# Patient Record
Sex: FEMALE | Race: WHITE
[De-identification: names, ages, dates, MRNs, and addresses within clinical notes are randomized per-mention and may not be internally consistent; named-entity substitution may affect disease eponyms.]

---

## 2017-06-18 NOTE — REPUSA
Clinical history: Right upper quadrant pain.

Findings: The pancreas is limited in visualization secondary to overlying bowel gas, but appears krishan
sly unremarkable. The liver demonstrates uniform echotexture and echogenicity, with no mass lesions. 
The gallbladder is unremarkable. The common bile duct measures  6 mm and is within normal limits. The
 right kidney measures 12.3 cm in length. There is a cyst in the upper pole measuring 10.2 x 7.6 x 9.
3 mm. There is no ascites.

Impression:

1. No acute abnormality to explain the patient's pain.

2. Simple cyst in the right kidney.

 

     Electronically signed by ZEESHAN GONZALES MD on 06/18/2017 11:24:21 PM ET

## 2018-06-22 ENCOUNTER — HOSPITAL ENCOUNTER (EMERGENCY)
Dept: HOSPITAL 53 - M ED | Age: 38
Discharge: HOME | End: 2018-06-22
Payer: COMMERCIAL

## 2018-06-22 DIAGNOSIS — F33.9: Primary | ICD-10-CM

## 2018-06-22 DIAGNOSIS — F17.200: ICD-10-CM

## 2018-06-22 DIAGNOSIS — F41.9: ICD-10-CM

## 2018-06-22 DIAGNOSIS — Z79.899: ICD-10-CM

## 2018-06-22 LAB
ACETAMINOPHEN LEVEL: < 2 UG/ML (ref 10–30)
ALBUMIN/GLOBULIN RATIO: 1.08 (ref 1–1.93)
ALBUMIN: 4.2 GM/DL (ref 3.2–5.2)
ALKALINE PHOSPHATASE: 75 U/L (ref 45–117)
ALT SERPL W P-5'-P-CCNC: 21 U/L (ref 12–78)
AMPHETAMINES UR QL SCN: POSITIVE
ANION GAP: 8 MEQ/L (ref 8–16)
AST SERPL-CCNC: 11 U/L (ref 7–37)
BARBITURATES UR QL SCN: NEGATIVE
BENZODIAZ UR QL SCN: NEGATIVE
BILIRUB CONJ SERPL-MCNC: 0.1 MG/DL (ref 0–0.2)
BILIRUBIN,TOTAL: 0.4 MG/DL (ref 0.2–1)
BLOOD UREA NITROGEN: 17 MG/DL (ref 7–18)
BZE UR QL SCN: NEGATIVE
CALCIUM LEVEL: 9 MG/DL (ref 8.5–10.1)
CANNABINOIDS UR QL SCN: NEGATIVE
CARBON DIOXIDE LEVEL: 30 MEQ/L (ref 21–32)
CHLORIDE LEVEL: 102 MEQ/L (ref 98–107)
CONTROL LINE HCG: (no result)
CREATININE FOR GFR: 0.9 MG/DL (ref 0.55–1.3)
ETHYL ALCOHOL (ETHANOL): < 0.003 % (ref 0–0.01)
GFR SERPL CREATININE-BSD FRML MDRD: > 60 ML/MIN/{1.73_M2} (ref 58–?)
GLUCOSE, FASTING: 124 MG/DL (ref 70–100)
HCG, SERUM QUALITATIVE: NEGATIVE
HEMATOCRIT: 42.6 % (ref 36–47)
HEMOGLOBIN: 14.2 G/DL (ref 12–15.5)
MEAN CORPUSCULAR HEMOGLOBIN: 30.9 PG (ref 27–33)
MEAN CORPUSCULAR HGB CONC: 33.3 G/DL (ref 32–36.5)
MEAN CORPUSCULAR VOLUME: 92.8 FL (ref 80–96)
METHADONE URINE: POSITIVE
NRBC BLD AUTO-RTO: 0 % (ref 0–0)
OPIATES UR QL SCN: NEGATIVE
PCP UR QL SCN: NEGATIVE
PLATELET COUNT, AUTOMATED: 301 10^3/UL (ref 150–450)
POTASSIUM SERUM: 4 MEQ/L (ref 3.5–5.1)
RED BLOOD COUNT: 4.59 10^6/UL (ref 4–5.4)
RED CELL DISTRIBUTION WIDTH: 12.1 % (ref 11.5–14.5)
SALICYLATE LEVEL: < 1.7 MG/DL (ref 5–30)
SODIUM LEVEL: 140 MEQ/L (ref 136–145)
THYROID STIMULATING HORMONE: 1.1 UIU/ML (ref 0.36–3.74)
TOTAL PROTEIN: 8.1 GM/DL (ref 6.4–8.2)
WHITE BLOOD COUNT: 9.1 10^3/UL (ref 4–10)

## 2018-06-26 ENCOUNTER — HOSPITAL ENCOUNTER (OUTPATIENT)
Dept: HOSPITAL 53 - M OUTALCOH | Age: 38
End: 2018-06-26
Attending: PSYCHIATRY & NEUROLOGY
Payer: COMMERCIAL

## 2018-06-26 DIAGNOSIS — F11.10: ICD-10-CM

## 2018-06-26 DIAGNOSIS — Z13.9: Primary | ICD-10-CM

## 2018-07-02 ENCOUNTER — HOSPITAL ENCOUNTER (EMERGENCY)
Dept: HOSPITAL 53 - M ED | Age: 38
Discharge: HOME | End: 2018-07-02
Payer: COMMERCIAL

## 2018-07-02 DIAGNOSIS — R19.7: Primary | ICD-10-CM

## 2018-07-02 DIAGNOSIS — N28.1: ICD-10-CM

## 2018-07-02 DIAGNOSIS — M51.9: ICD-10-CM

## 2018-07-02 DIAGNOSIS — R10.84: ICD-10-CM

## 2018-07-02 DIAGNOSIS — F17.210: ICD-10-CM

## 2018-07-02 DIAGNOSIS — Z79.899: ICD-10-CM

## 2018-07-02 LAB
ALBUMIN/GLOBULIN RATIO: 1.12 (ref 1–1.93)
ALBUMIN: 3.7 GM/DL (ref 3.2–5.2)
ALKALINE PHOSPHATASE: 63 U/L (ref 45–117)
ALT SERPL W P-5'-P-CCNC: 19 U/L (ref 12–78)
AMORPH SED URNS QL MICRO: (no result)
ANION GAP: 8 MEQ/L (ref 8–16)
APPEARANCE, URINE: (no result)
AST SERPL-CCNC: 11 U/L (ref 7–37)
BACTERIA UR QL AUTO: NEGATIVE
BASO #: 0 10^3/UL (ref 0–0.2)
BASO %: 0.5 % (ref 0–1)
BILIRUBIN, URINE AUTO: NEGATIVE
BILIRUBIN,TOTAL: 0.3 MG/DL (ref 0.2–1)
BLOOD UREA NITROGEN: 10 MG/DL (ref 7–18)
BLOOD, URINE BLOOD: NEGATIVE
CALCIUM LEVEL: 8.3 MG/DL (ref 8.5–10.1)
CAOX CRY URNS QL MICRO: (no result)
CARBON DIOXIDE LEVEL: 27 MEQ/L (ref 21–32)
CHLORIDE LEVEL: 108 MEQ/L (ref 98–107)
CREATININE FOR GFR: 0.75 MG/DL (ref 0.55–1.3)
EOS #: 0 10^3/UL (ref 0–0.5)
EOSINOPHIL NFR BLD AUTO: 0.1 % (ref 0–3)
GFR SERPL CREATININE-BSD FRML MDRD: > 60 ML/MIN/{1.73_M2} (ref 58–?)
GLUCOSE, FASTING: 105 MG/DL (ref 70–100)
GLUCOSE, URINE (UA) AUTO: NEGATIVE MG/DL
HEMATOCRIT: 39.1 % (ref 36–47)
HEMOGLOBIN: 13.4 G/DL (ref 12–15.5)
IMMATURE GRANULOCYTE %: 0.3 % (ref 0–3)
KETONE, URINE AUTO: NEGATIVE MG/DL
LEUKOCYTE ESTERASE UR QL STRIP.AUTO: (no result)
LIPASE: 140 U/L (ref 73–393)
LYMPH #: 2.3 10^3/UL (ref 1.5–4.5)
LYMPH %: 25.5 % (ref 24–44)
MEAN CORPUSCULAR HEMOGLOBIN: 31.8 PG (ref 27–33)
MEAN CORPUSCULAR HGB CONC: 34.3 G/DL (ref 32–36.5)
MEAN CORPUSCULAR VOLUME: 92.7 FL (ref 80–96)
MONO #: 0.8 10^3/UL (ref 0–0.8)
MONO %: 8.6 % (ref 0–5)
MUCUS, URINE: (no result)
NEUTROPHILS #: 5.7 10^3/UL (ref 1.8–7.7)
NEUTROPHILS %: 65 % (ref 36–66)
NITRITE, URINE AUTO: NEGATIVE
NRBC BLD AUTO-RTO: 0 % (ref 0–0)
PH,URINE: 7 UNITS (ref 5–9)
PLATELET COUNT, AUTOMATED: 260 10^3/UL (ref 150–450)
POTASSIUM SERUM: 3.5 MEQ/L (ref 3.5–5.1)
PROT UR QL STRIP.AUTO: NEGATIVE MG/DL
RBC, URINE AUTO: 2 /HPF (ref 0–3)
RED BLOOD COUNT: 4.22 10^6/UL (ref 4–5.4)
RED CELL DISTRIBUTION WIDTH: 12.3 % (ref 11.5–14.5)
SODIUM LEVEL: 143 MEQ/L (ref 136–145)
SPECIFIC GRAVITY URINE AUTO: 1.02 (ref 1–1.03)
SQUAMOUS #/AREA URNS AUTO: 6 /HPF (ref 0–6)
TOTAL PROTEIN: 7 GM/DL (ref 6.4–8.2)
UROBILINOGEN, URINE AUTO: 0.2 MG/DL (ref 0–2)
WBC, URINE AUTO: 3 /HPF (ref 0–3)
WHITE BLOOD COUNT: 8.8 10^3/UL (ref 4–10)

## 2018-07-02 RX ADMIN — SODIUM CHLORIDE 1 MLS/HR: 9 INJECTION, SOLUTION INTRAVENOUS at 16:41

## 2018-07-02 RX ADMIN — METHOCARBAMOL 1 MG: 100 INJECTION, SOLUTION INTRAMUSCULAR; INTRAVENOUS at 16:41

## 2018-07-20 ENCOUNTER — HOSPITAL ENCOUNTER (OUTPATIENT)
Dept: HOSPITAL 53 - M LAB | Age: 38
End: 2018-07-20
Attending: NURSE PRACTITIONER

## 2018-07-20 DIAGNOSIS — Z02.89: Primary | ICD-10-CM

## 2018-07-20 LAB — RUBELLA IGG QUALITATIVE: (no result)

## 2018-07-21 LAB — RUBEOLA IGG ANTIBODY: 96.3 AU/ML

## 2018-09-19 ENCOUNTER — HOSPITAL ENCOUNTER (OUTPATIENT)
Dept: HOSPITAL 53 - M SFHCLERA | Age: 38
End: 2018-09-19
Attending: NURSE PRACTITIONER
Payer: COMMERCIAL

## 2018-09-19 DIAGNOSIS — J02.9: Primary | ICD-10-CM

## 2019-03-08 ENCOUNTER — HOSPITAL ENCOUNTER (OUTPATIENT)
Dept: HOSPITAL 53 - M SFHCLERA | Age: 39
End: 2019-03-08
Attending: PHYSICIAN ASSISTANT
Payer: COMMERCIAL

## 2019-03-08 DIAGNOSIS — J02.9: Primary | ICD-10-CM

## 2019-03-11 ENCOUNTER — HOSPITAL ENCOUNTER (OUTPATIENT)
Dept: HOSPITAL 53 - M LRY | Age: 39
End: 2019-03-11
Attending: PHYSICIAN ASSISTANT
Payer: COMMERCIAL

## 2019-03-11 DIAGNOSIS — R05: ICD-10-CM

## 2019-03-11 DIAGNOSIS — R50.9: Primary | ICD-10-CM

## 2019-03-11 DIAGNOSIS — Z53.8: ICD-10-CM

## 2019-07-08 ENCOUNTER — HOSPITAL ENCOUNTER (EMERGENCY)
Dept: HOSPITAL 53 - M ED | Age: 39
Discharge: LEFT BEFORE DISCHARGE | End: 2019-07-08
Payer: COMMERCIAL

## 2019-07-08 VITALS — WEIGHT: 160.34 LBS | BODY MASS INDEX: 28.41 KG/M2 | HEIGHT: 63 IN

## 2019-07-08 VITALS — SYSTOLIC BLOOD PRESSURE: 152 MMHG | DIASTOLIC BLOOD PRESSURE: 93 MMHG

## 2019-07-08 DIAGNOSIS — R07.0: Primary | ICD-10-CM

## 2019-07-08 DIAGNOSIS — Z53.21: ICD-10-CM

## 2019-07-19 ENCOUNTER — HOSPITAL ENCOUNTER (OUTPATIENT)
Dept: HOSPITAL 53 - M SFHCLERA | Age: 39
End: 2019-07-19
Attending: PHYSICIAN ASSISTANT
Payer: COMMERCIAL

## 2019-07-19 DIAGNOSIS — J30.9: Primary | ICD-10-CM

## 2019-07-19 LAB
BASOPHILS # BLD AUTO: 0.1 10^3/UL (ref 0–0.2)
BASOPHILS NFR BLD AUTO: 0.6 % (ref 0–1)
EOSINOPHIL # BLD AUTO: 0.1 10^3/UL (ref 0–0.5)
EOSINOPHIL NFR BLD AUTO: 0.7 % (ref 0–3)
HCT VFR BLD AUTO: 39.8 % (ref 36–47)
HGB BLD-MCNC: 13.4 G/DL (ref 12–15.5)
LYMPHOCYTES # BLD AUTO: 1.3 10^3/UL (ref 1.5–4.5)
LYMPHOCYTES NFR BLD AUTO: 14.7 % (ref 24–44)
MCH RBC QN AUTO: 33.1 PG (ref 27–33)
MCHC RBC AUTO-ENTMCNC: 33.7 G/DL (ref 32–36.5)
MCV RBC AUTO: 98.3 FL (ref 80–96)
MONOCYTES # BLD AUTO: 0.8 10^3/UL (ref 0–0.8)
MONOCYTES NFR BLD AUTO: 9.3 % (ref 0–5)
NEUTROPHILS # BLD AUTO: 6.8 10^3/UL (ref 1.8–7.7)
NEUTROPHILS NFR BLD AUTO: 74.4 % (ref 36–66)
PLATELET # BLD AUTO: 258 10^3/UL (ref 150–450)
RBC # BLD AUTO: 4.05 10^6/UL (ref 4–5.4)
T4 FREE SERPL-MCNC: 0.76 NG/DL (ref 0.76–1.46)
TSH SERPL DL<=0.005 MIU/L-ACNC: 1.6 UIU/ML (ref 0.36–3.74)
WBC # BLD AUTO: 9.1 10^3/UL (ref 4–10)

## 2019-08-01 ENCOUNTER — HOSPITAL ENCOUNTER (OUTPATIENT)
Dept: HOSPITAL 53 - M LAB REF | Age: 39
End: 2019-08-01
Attending: INTERNAL MEDICINE
Payer: COMMERCIAL

## 2019-08-01 DIAGNOSIS — E04.1: Primary | ICD-10-CM

## 2019-09-04 ENCOUNTER — HOSPITAL ENCOUNTER (EMERGENCY)
Dept: HOSPITAL 53 - M ED | Age: 39
Discharge: ANOTHER HOSPITAL | End: 2019-09-04
Payer: COMMERCIAL

## 2019-09-04 VITALS — HEIGHT: 63 IN | WEIGHT: 160.34 LBS | BODY MASS INDEX: 28.41 KG/M2

## 2019-09-04 VITALS — DIASTOLIC BLOOD PRESSURE: 80 MMHG | SYSTOLIC BLOOD PRESSURE: 124 MMHG

## 2019-09-04 DIAGNOSIS — Z79.899: ICD-10-CM

## 2019-09-04 DIAGNOSIS — R00.0: ICD-10-CM

## 2019-09-04 DIAGNOSIS — F17.200: ICD-10-CM

## 2019-09-04 DIAGNOSIS — I44.4: ICD-10-CM

## 2019-09-04 DIAGNOSIS — E06.9: Primary | ICD-10-CM

## 2019-09-04 LAB
BASOPHILS # BLD AUTO: 0 10^3/UL (ref 0–0.2)
BASOPHILS NFR BLD AUTO: 0.4 % (ref 0–1)
BUN SERPL-MCNC: 15 MG/DL (ref 7–18)
CALCIUM SERPL-MCNC: 8.9 MG/DL (ref 8.5–10.1)
CHLORIDE SERPL-SCNC: 107 MEQ/L (ref 98–107)
CK MB CFR.DF SERPL CALC: 0.8
CK MB SERPL-MCNC: 1.6 NG/ML (ref ?–3.6)
CK SERPL-CCNC: 199 U/L (ref 26–192)
CO2 SERPL-SCNC: 29 MEQ/L (ref 21–32)
CREAT SERPL-MCNC: 0.68 MG/DL (ref 0.55–1.3)
EOSINOPHIL # BLD AUTO: 0 10^3/UL (ref 0–0.5)
EOSINOPHIL NFR BLD AUTO: 0.5 % (ref 0–3)
FT4I SERPL CALC-MCNC: 2.4 % (ref 1.3–4.8)
GFR SERPL CREATININE-BSD FRML MDRD: > 60 ML/MIN/{1.73_M2} (ref 58–?)
GLUCOSE SERPL-MCNC: 110 MG/DL (ref 70–100)
HCT VFR BLD AUTO: 41 % (ref 36–47)
HGB BLD-MCNC: 13.9 G/DL (ref 12–15.5)
LYMPHOCYTES # BLD AUTO: 1.5 10^3/UL (ref 1.5–5)
LYMPHOCYTES NFR BLD AUTO: 20.1 % (ref 24–44)
MCH RBC QN AUTO: 32.4 PG (ref 27–33)
MCHC RBC AUTO-ENTMCNC: 33.9 G/DL (ref 32–36.5)
MCV RBC AUTO: 95.6 FL (ref 80–96)
MONOCYTES # BLD AUTO: 0.6 10^3/UL (ref 0–0.8)
MONOCYTES NFR BLD AUTO: 8.3 % (ref 0–5)
NEUTROPHILS # BLD AUTO: 5.3 10^3/UL (ref 1.5–8.5)
NEUTROPHILS NFR BLD AUTO: 70.4 % (ref 36–66)
PLATELET # BLD AUTO: 279 10^3/UL (ref 150–450)
POTASSIUM SERPL-SCNC: 3.9 MEQ/L (ref 3.5–5.1)
RBC # BLD AUTO: 4.29 10^6/UL (ref 4–5.4)
SODIUM SERPL-SCNC: 139 MEQ/L (ref 136–145)
T3RU NFR SERPL: 31 % (ref 30–39)
T4 FREE SERPL-MCNC: 0.8 NG/DL (ref 0.76–1.46)
T4 SERPL-MCNC: 7.8 UG/DL (ref 4.5–12)
TROPONIN I SERPL-MCNC: < 0.02 NG/ML (ref ?–0.1)
TSH SERPL DL<=0.005 MIU/L-ACNC: 2.17 UIU/ML (ref 0.36–3.74)
WBC # BLD AUTO: 7.6 10^3/UL (ref 4–10)

## 2019-09-04 PROCEDURE — 82550 ASSAY OF CK (CPK): CPT

## 2019-09-04 PROCEDURE — 84439 ASSAY OF FREE THYROXINE: CPT

## 2019-09-04 PROCEDURE — 70491 CT SOFT TISSUE NECK W/DYE: CPT

## 2019-09-04 PROCEDURE — 94760 N-INVAS EAR/PLS OXIMETRY 1: CPT

## 2019-09-04 PROCEDURE — 96365 THER/PROPH/DIAG IV INF INIT: CPT

## 2019-09-04 PROCEDURE — 99285 EMERGENCY DEPT VISIT HI MDM: CPT

## 2019-09-04 PROCEDURE — 85025 COMPLETE CBC W/AUTO DIFF WBC: CPT

## 2019-09-04 PROCEDURE — 93005 ELECTROCARDIOGRAM TRACING: CPT

## 2019-09-04 PROCEDURE — 84443 ASSAY THYROID STIM HORMONE: CPT

## 2019-09-04 PROCEDURE — 80048 BASIC METABOLIC PNL TOTAL CA: CPT

## 2019-09-04 PROCEDURE — 84484 ASSAY OF TROPONIN QUANT: CPT

## 2019-09-04 PROCEDURE — 93041 RHYTHM ECG TRACING: CPT

## 2019-09-04 PROCEDURE — 84436 ASSAY OF TOTAL THYROXINE: CPT

## 2019-09-04 PROCEDURE — 36415 COLL VENOUS BLD VENIPUNCTURE: CPT

## 2019-09-04 PROCEDURE — 82553 CREATINE MB FRACTION: CPT

## 2019-09-04 PROCEDURE — 84479 ASSAY OF THYROID (T3 OR T4): CPT

## 2019-09-04 NOTE — REP
Clinical:  Enlarged tender thyroid gland.

 

Technique:  Axial contrast enhanced images from the skull base to the thoracic

inlet with coronal and sagittal re-formations using 100 ml Isovue 370 intravenous

contrast material.

 

Findings:

The right thyroid gland appears to be completely replaced by a large low density

possibly complex cystic lesion/nodule which measures roughly 7.8 x 5.2 x 4.1 cm

causing mass effect and mild contralateral midline shift to the trachea.  No

obvious significant residual normal enhancing thyroid tissue to the right lobe is

appreciated.  There is no significant enhancement pattern or internal gas to

suggest abscess.  These findings are relatively similar to recent ultrasound

dated 07/09/2019.  Associated reactive adenopathy in the cervical and jugular

chains are appreciated measuring up to 2 cm and suggest an

infectious/inflammatory process of the thyroid gland.

 

Remainder of the contrast enhanced CT of the neck appears normal.

 

Impression:

Enlarged right thyroid lobe with relatively low density texture and no

significant obvious enhancement.  Associated adenopathy is appreciated and

findings suggest an infectious/inflammatory process and possible phlegmonous

changes without discrete abscess.  Ultrasound examination should be considered to

evaluate for drainable fluid or other abnormality.

 

 

Electronically Signed by

Александр Renae MD 09/04/2019 04:24 P

## 2019-09-04 NOTE — ECGEPIP
[facility] - ED

                                       

                                       Test Date:    2019

Pat Name:     DAT BLAKE             Department:   

Patient ID:   [id number]                 Room:         -

Gender:       Female                   Technician:   DINORA

:          1980               Requested By: PEDRO ANDERSON

Order Number: [id number]     Reading MD:   Dia Neri

                                 Measurements

Intervals                              Axis          

Rate:         110                      P:            73

RI:           146                      QRS:          -75

QRSD:         82                       T:            57

QT:           321                                    

QTc:          435                                    

                           Interpretive Statements

SINUS TACHYCARDIA

LEFT ANTERIOR FASCICULAR BLOCK

NSTTW abnormalities

NO PRIOR

Electronically Signed on 2019 20:28:33 EDT by Dia Neri

## 2019-09-24 ENCOUNTER — HOSPITAL ENCOUNTER (OUTPATIENT)
Dept: HOSPITAL 53 - M CARPUL | Age: 39
End: 2019-09-24
Attending: NURSE PRACTITIONER
Payer: COMMERCIAL

## 2019-09-24 DIAGNOSIS — C85.10: Primary | ICD-10-CM

## 2019-09-25 ENCOUNTER — HOSPITAL ENCOUNTER (OUTPATIENT)
Dept: HOSPITAL 53 - M RAD | Age: 39
End: 2019-09-25
Attending: NURSE PRACTITIONER
Payer: COMMERCIAL

## 2019-09-25 DIAGNOSIS — C85.11: Primary | ICD-10-CM

## 2019-09-25 NOTE — ECHO
DATE OF PROCEDURE:  09/24/2019

 

AGE: 39

GENDER: Female

HEIGHT: 63 inches

WEIGHT:  160 pounds

BODY SURFACE AREA: 1.76 m2

 

PATIENT LOCATION: Outpatient

 

REFERRING PHYSICIAN: Flori Garcia NP

 

INDICATION:   Lymphoma, potentially cardiotoxic chemotherapy.

 

2-D MEASUREMENTS:

RV: 3.0 cm

LV: 5.4 cm

Septum: 0.8 cm

Posterior wall: 0.8 cm

Aortic root: 2.8 cm

LA: 2.9 cm

LVEF: 70%

 

DOPPLER MEASUREMENTS:

AV: 1.4 m/s

LVOT:  1.0 m/s

LVOT diameter:  2.0 cm

MV-E: 94, A: 65, EA ratio: 1.5

Early mitral deceleration time: 150 ms

E prime: 12.4, A prime: 9, E/E prime ratio: 7.6

PV: 0.9 m/s

Pulmonary artery acceleration time: 140 ms

PASP: 20 mmHg

IVC: 1.3 cm

 

COMMENTS

Normal sinus rhythm without intraventricular conduction disturbance.

 

M-mode and two-dimensional echocardiography was performed with pulsed, continuous

wave, color flow and tissue Doppler studies.

 

Normal left ventricular size, wall thickness and hyperkinetic wall motion.

 

Normal left atrial size and Doppler assessment of LV diastolic function and

estimated mean left atrial pressure.

 

Normal right heart chamber sizes and motion and estimated pulmonary arterial

pressure.

 

Normal IVC size and collapse against an elevated pulmonary vascular resistance.

 

Normal appearing and functioning valvular structures.

 

Normal aortic root size.

 

No apparent intracardiac mass or pericardial effusion.

## 2019-09-25 NOTE — REP
CT of the chest with IV contrast:

 

There are no comparison chest CT studies.

There is a large anterior mediastinal mass measuring up to 5.0 cm in AP diameter

resulting in shift of the upper trachea to the left and slight tracheal

narrowing.  This may represent a thyroid mass.

There is no other mediastinal adenopathy or mass.

There is no hilar adenopathy.  There is no axillary adenopathy.

There are no infiltrates or pleural effusions.  There are no lung nodules or

masses.  There are no lytic, blastic or destructive skeletal changes.

Impression:

 

Large anterior mediastinal mass as described, similar to the neck CT dated

09/04/2019.

No other mediastinal, hilar or axillary adenopathy or mass.

There are no lung masses or nodules.  There are no infiltrates or effusions.

No skeletal lesions are identified.

 

 

Electronically Signed by

Tyrel Bernardo MD 09/25/2019 12:21 P

## 2019-09-25 NOTE — REP
CT of the abdomen and pelvis with IV and oral contrast:

Comparison is 07/02. 2018.

The study is performed.  Contiguous with the chest CT of this same date.

There is a dual phase imaging, initially during the portal venous phase of

enhancement and again during the equilibrium phase of enhancement.

The visualized lower lung fields are unremarkable. The the hepatic parenchyma is

homogeneous.  The gallbladder, pancreas and spleen are unremarkable.

The adrenals and kidneys are unremarkable except for a small left renal upper

pole Bosniak type 1 renal cortical cyst, unchanged from the prior study.

The abdominal aorta is unremarkable.  There is no periaortic adenopathy or mass.

The large and small bowel are unremarkable.  The mesentery is unremarkable.

There is no mesenteric lymph node enlargement.

There is no ascites.

Pelvis:

The appendix is unremarkable.  The uterus and left adnexa are unremarkable.

There is a right adnexal 15 ml follicle.

The bladder is unremarkable.  There is no pelvic adenopathy.  There is no pelvic

ascites.

There are no lytic, blastic or destructive skeletal changes.

Impression:

Essentially negative CT study of the abdomen and pelvis.

There is no lymphadenopathy, mass, or ascites.

 

 

Electronically Signed by

Tyrel Bernardo MD 09/25/2019 12:32 P

## 2019-09-25 NOTE — REP
CT NECK WITH IV CONTRAST:

 

CT soft tissues neck performed following the intravenous administration of 100 mL

of Isovue 370.  Sagittal and coronal reconstruction images are performed.

Comparison is made with a prior study of 09/04/2019.

 

Once again there is a large mass in the region of the right thyroid.  Superior

extent is at about the level of the hyoid bone and inferiorly the mass extends

into the superior mediastinum.  There is mild mass effect and on the trachea,

mildly narrowing the trachea.  This unchanged since 09/04/2019.  Trachea is

deviated to the left of midline.  Size appears to have slightly increased since

that prior exam.  Maximum AP dimension is approximately 5.3 cm, previously about

5 cm.  Maximum transverse dimension is approximately 6.5 cm, previously 5.9 cm.

Visually the craniocaudal dimension appears to have slightly increased, but it is

difficult to measure as the inferior margin is not well defined.  There is mild

increase in adenopathy.  There are two enlarged lymph nodes at the posterior

angle of the right mandible, one anterior to the jugular vein measuring about 2.3

x 1.3 cm and another posterior to the jugular vein measuring approximately 2.3 x

1.6 cm.  Both of these have increased in size.  Multiple subcentimeter lymph

nodes are seen along the carotid and jugular vessels both on the right and the

left.  A cluster of adenopathy in the right supraclavicular region along the

posterior margin of the carotid and jugular vessels appears to have slightly

increased in size.  A mildly enlarged left paratracheal lymph node is seen at

that level 1.3 cm in short axis and another is seen more laterally 1.1 cm in

short axis.  A mildly enlarged lymph node is seen in the left posterior triangle

at about the level of the hyoid bone.  Parotid glands are symmetrical as are the

submandibular glands with no intrinsic abnormality.  There may be mild

enlargement of the right palatine tonsil.  Aryepiglottic folds are symmetrical.

There are mild degenerative changes of the spine.

 

IMPRESSION:

 

Mild increase in size of large mass in the region of the right thyroid.  There is

mild impression upon the trachea causing mild luminal narrowing, but the caliber

is unchanged since 09/04/2019.  Trachea is deviated to the left.  There is mild

increase in bilateral neck adenopathy, which is more significant on the right

than on the left.

 

 

Electronically Signed by

Tyrel Tao MD 09/25/2019 03:20 P

## 2019-09-30 ENCOUNTER — HOSPITAL ENCOUNTER (OUTPATIENT)
Dept: HOSPITAL 53 - M IRPRO | Age: 39
End: 2019-09-30
Attending: RADIOLOGY
Payer: COMMERCIAL

## 2019-09-30 VITALS — DIASTOLIC BLOOD PRESSURE: 90 MMHG | SYSTOLIC BLOOD PRESSURE: 144 MMHG

## 2019-09-30 DIAGNOSIS — C83.30: Primary | ICD-10-CM

## 2019-09-30 DIAGNOSIS — J30.9: ICD-10-CM

## 2019-09-30 DIAGNOSIS — E04.9: ICD-10-CM

## 2019-09-30 PROCEDURE — 99153 MOD SED SAME PHYS/QHP EA: CPT

## 2019-09-30 PROCEDURE — 99152 MOD SED SAME PHYS/QHP 5/>YRS: CPT

## 2019-09-30 PROCEDURE — 36561 INSERT TUNNELED CV CATH: CPT

## 2019-09-30 PROCEDURE — 76937 US GUIDE VASCULAR ACCESS: CPT

## 2019-09-30 NOTE — POST-OPPD
Postoperative Procedure Note


Date Of Procedure:  Sep 30, 2019


Time Of Procedure:  10:19


PREOPERATIVE DIAGNOSIS: lymphoma





POSTOPERATIVE DIAGNOSIS:  lymphoma





FINDINGS: partially patent right IJ





PROCEDURE: right IJ port





SURGEON: Daniel








ANESTHESIA: moderate sedation





ESTIMATED BLOOD LOSS: < 5 ml








COMPLICATIONS: none





POSTOPERATIVE CONDITION: stable











RONNY ARRIAZA MD               Sep 30, 2019 10:21

## 2019-09-30 NOTE — REP
IR Ultrasound and fluoroscopy-guided port placement.

 

IR Ultrasound of the neck.

 

IR Moderate sedation.

 

Clinical information: Lymphoma.

 

Physician:  Dr. Sanchez.

 

Procedure:  The patient was advised of the benefits, risks, and alternatives of

the procedure and informed consent was obtained.

 

A time-out was performed with verification of the patient's name, MRN, site of

procedure and type of procedure to be performed.  The patient was positioned in

the supine position on the angiographic table.  The site was prepped and draped

in the usual sterile fashion.

 

Moderate sedation was performed by the physician including the presence of an

independent trained observer who assisted and monitored the patient's level of

consciousness and physiologic status.  Following the administration of Fentanyl

and Versed, the physician spent  45 minutes of continuous face to face time with

the patient.

 

Ultrasound of the neck reveals a patent and partly compressible right internal

jugular vein.

 

A  radiograph reveals no gross abnormality.

 

The neck and anterior chest wall were anesthetized with lidocaine.  The right

internal jugular vein was accessed using a microintroducer needle by a lateral

approach.  An 018 wire was advanced into the superior vena cava, the needle was

removed and a microsheath was placed.  An Amplatz wire was then passed into the

inferior vena cava.  An incision at the internal jugular vein access site and

anterior chest wall were made using a scalpel.

 

An incision was made at the anterior chest wall.  A small pocket was created

using a combination of blunt and sharp dissection.  A tunneling device was then

used to pass the catheter from the pocket to the neck puncture site.  An 8-[employer]

Angiodynamics smart power port was then positioned in the pocket.  The catheter

was then measured and cut.

 

The introducer sheath was exchanged for a peel-away sheath.  The catheter was

passed through the peel-away sheath into the internal jugular vein and the

peel-away sheath was removed.  The port tip was positioned at the cavo atrial

junction. The port was then accessed with a Adame needle.  The port flushes and

aspirates well.  The puncture site in the neck was closed.  The chest wall

incision was then closed with 2-0 Vicryl and 4-0 Monocryl.  Glue and Steri-Strips

were applied.  A sterile dressing was then applied.

 

The patient tolerated the procedure well and was returned to the PRU in stable

condition.

 

Estimated blood loss: <5 ml.

 

Complications: None.

 

Conclusion:

 

 

 

1.  Successful placement of an 8-[place] Angi[employer] smart power port via the

right internal jugular vein.  The port is ready for immediate use.

2.  Patient to follow up in IR clinic in 2 weeks.

 

Thank you for this referral.

 

 

Electronically Signed by

Rebeca Sanchez MD 09/30/2019 03:08 P

## 2019-10-08 ENCOUNTER — HOSPITAL ENCOUNTER (OUTPATIENT)
Dept: HOSPITAL 53 - M PLARAD | Age: 39
End: 2019-10-08
Attending: NURSE PRACTITIONER
Payer: COMMERCIAL

## 2019-10-08 DIAGNOSIS — C83.31: Primary | ICD-10-CM

## 2019-10-08 NOTE — REP
PET/CT:

 

History: Staging B-cell lymphoma. Status post excisional biopsy right cervical

lymphadenopathy and chemotherapy.

 

Comparisons: Comparison CT study of the neck, chest, abdomen and pelvis September 25, 2019.

 

TECHNIQUE:

 

49 minutes following the intravenous injection of a 8.50 mCi dose of F-18 FDG,

three-dimensional PET scintigraphy is acquired from the skull base to the

proximal thighs. Triplanar noncontrast CT scanning is acquired through the same

anatomic range for attenuation correction, and image registration with scan

parameters optimized to minimize radiation exposure to the patient. PET

scintigraphy and CT datasets were fused and displayed on a workstation with

multiplanar and projection display capability.

 

PET/CT Findings: The previously noted large bulky adenopathy in the right neck is

much improved.  There is a small focus of minimally hypermetabolic uptake in the

right neck subcutaneously with maximum standard uptake value 3.2.  Mildly

hypermetabolic uptake is seen in the right thyroid bed just to the right of the

trachea, maximum standard uptake value 3.77.  There is a right-sided

Wfuvqf-C-Txyl.  There is no abnormal hypermetabolic uptake in the thorax.  There

is no abnormal hypermetabolic uptake in the abdomen or pelvis.  There is diffuse

increased uptake in the axial skeleton consistent with reactive marrow activity

induced by chemotherapy.  No focal suspicious uptake.  There is some mild

misregistration due to patient movement.

 

Impression:

 

The right neck lymphadenopathy is markedly improved.  Mild residual

hypermetabolic uptake in the right thyroid bed and in a single focus in the

subcutaneous region of the right neck.  No other hypermetabolic uptake is

appreciated.

 

 

Electronically Signed by

Rishi Garzon MD 10/09/2019 09:17 A

## 2019-10-30 ENCOUNTER — HOSPITAL ENCOUNTER (EMERGENCY)
Dept: HOSPITAL 53 - M ED | Age: 39
LOS: 1 days | Discharge: HOME | End: 2019-10-31
Payer: COMMERCIAL

## 2019-10-30 VITALS — WEIGHT: 135.28 LBS | HEIGHT: 63 IN | BODY MASS INDEX: 23.97 KG/M2

## 2019-10-30 DIAGNOSIS — E06.3: ICD-10-CM

## 2019-10-30 DIAGNOSIS — R00.0: ICD-10-CM

## 2019-10-30 DIAGNOSIS — R94.31: ICD-10-CM

## 2019-10-30 DIAGNOSIS — C83.31: ICD-10-CM

## 2019-10-30 DIAGNOSIS — R07.89: Primary | ICD-10-CM

## 2019-10-30 DIAGNOSIS — Z95.828: ICD-10-CM

## 2019-10-30 DIAGNOSIS — F17.200: ICD-10-CM

## 2019-10-30 DIAGNOSIS — F10.10: ICD-10-CM

## 2019-10-30 DIAGNOSIS — Z79.899: ICD-10-CM

## 2019-10-30 LAB
BASOPHILS NFR BLD MANUAL: 1 % (ref 0–1)
BUN SERPL-MCNC: 9 MG/DL (ref 7–18)
CALCIUM SERPL-MCNC: 10.1 MG/DL (ref 8.5–10.1)
CHLORIDE SERPL-SCNC: 104 MEQ/L (ref 98–107)
CK MB CFR.DF SERPL CALC: 1.2
CK MB SERPL-MCNC: < 1 NG/ML (ref ?–3.6)
CK SERPL-CCNC: 83 U/L (ref 26–192)
CO2 SERPL-SCNC: 29 MEQ/L (ref 21–32)
CREAT SERPL-MCNC: 0.55 MG/DL (ref 0.55–1.3)
ETHANOL SERPL-MCNC: 0.15 % (ref 0–0.01)
GFR SERPL CREATININE-BSD FRML MDRD: > 60 ML/MIN/{1.73_M2} (ref 58–?)
GLUCOSE SERPL-MCNC: 107 MG/DL (ref 70–100)
HCT VFR BLD AUTO: 36.2 % (ref 36–47)
HGB BLD-MCNC: 11.9 G/DL (ref 12–15.5)
LYMPHOCYTES NFR BLD MANUAL: 18 % (ref 16–44)
MCH RBC QN AUTO: 32.6 PG (ref 27–33)
MCHC RBC AUTO-ENTMCNC: 32.9 G/DL (ref 32–36.5)
MCV RBC AUTO: 99.2 FL (ref 80–96)
MONOCYTES NFR BLD MANUAL: 5 % (ref 0–5)
NEUTROPHILS NFR BLD MANUAL: 75 % (ref 28–66)
PLATELET # BLD AUTO: 159 10^3/UL (ref 150–450)
POTASSIUM SERPL-SCNC: 4 MEQ/L (ref 3.5–5.1)
RBC # BLD AUTO: 3.65 10^6/UL (ref 4–5.4)
SODIUM SERPL-SCNC: 140 MEQ/L (ref 136–145)
TROPONIN I SERPL-MCNC: < 0.02 NG/ML (ref ?–0.1)
WBC # BLD AUTO: 9.2 10^3/UL (ref 4–10)

## 2019-10-30 PROCEDURE — 82550 ASSAY OF CK (CPK): CPT

## 2019-10-30 PROCEDURE — 82553 CREATINE MB FRACTION: CPT

## 2019-10-30 PROCEDURE — 80048 BASIC METABOLIC PNL TOTAL CA: CPT

## 2019-10-30 PROCEDURE — 87040 BLOOD CULTURE FOR BACTERIA: CPT

## 2019-10-30 PROCEDURE — 94760 N-INVAS EAR/PLS OXIMETRY 1: CPT

## 2019-10-30 PROCEDURE — 85025 COMPLETE CBC W/AUTO DIFF WBC: CPT

## 2019-10-30 PROCEDURE — 71045 X-RAY EXAM CHEST 1 VIEW: CPT

## 2019-10-30 PROCEDURE — 36415 COLL VENOUS BLD VENIPUNCTURE: CPT

## 2019-10-30 PROCEDURE — 84484 ASSAY OF TROPONIN QUANT: CPT

## 2019-10-30 PROCEDURE — 93041 RHYTHM ECG TRACING: CPT

## 2019-10-30 PROCEDURE — 71275 CT ANGIOGRAPHY CHEST: CPT

## 2019-10-30 PROCEDURE — 93005 ELECTROCARDIOGRAM TRACING: CPT

## 2019-10-30 PROCEDURE — 99284 EMERGENCY DEPT VISIT MOD MDM: CPT

## 2019-10-31 VITALS — DIASTOLIC BLOOD PRESSURE: 70 MMHG | SYSTOLIC BLOOD PRESSURE: 121 MMHG

## 2019-10-31 LAB — PLATELET BLD QL SMEAR: NORMAL

## 2019-10-31 NOTE — REP
Portable chest x-ray:  Single view.

 

History:  Chest pain.

 

Comparison is made with chest CT findings from September 25, 2019.

 

Findings:  There is a right-sided Ierylq-T-Uwno catheter in place.  The lungs are

well inflated and clear.  Pleural angles are sharp.  Heart size is normal.

Mediastinum is not visibly widened.

 

Impression:

 

Ofldxg-L-Aifo catheter in place.  No acute disease.

 

 

Electronically Signed by

Rishi Garzon MD 10/31/2019 07:47 A

## 2019-10-31 NOTE — REPVR
PROCEDURE INFORMATION: 

Exam: CT Angiography Chest With Contrast 

Exam date and time: 10/31/2019 12:05 AM 

Clinical history: 39 years old, female; Chest pain; Type not specified; 

Additional info: Cp 



TECHNIQUE: 

Imaging protocol: Computed tomographic angiography of the chest with 

intravenous contrast. 

3D rendering: MIP reconstructed images were created and reviewed. 

Radiation optimization: All CT scans at this facility use at least one of these 

dose optimization techniques: automated exposure control; mA and/or kV 

adjustment per patient size (includes targeted exams where dose is matched to 

clinical indication); or iterative reconstruction. 

Contrast material: ISO; Contrast volume: 75 ml; Contrast route: HAND;  



COMPARISON: 

CT Chest with contrast 9/25/2019 12:03 PM 



FINDINGS: 

Pulmonary arteries: No focal pulmonary artery filling defect to suggest acute 

pulmonary embolus. 

Aorta: No thoracic aortic aneurysm or dissection. 

Lungs: Pulmonary vascular/interstitial pattern does not suggest active 

pulmonary edema. No suspicious lung mass or air space process. No central 

endobronchial lesion. 

Pleural space: No pleural effusion or pneumothorax. 

Heart: No cardiac enlargement or pericardial effusion. 

Lymph nodes: Small, nonspecific mediastinal nodes are present. 

Bones/joints: Bony structures show no acute fracture or destructive process. 



IMPRESSION: 

1. No evidence of acute pulmonary embolus. 

2. No other acute or concerning focal intrathoracic abnormality. 



Electronically signed by: Missael Larsen On 10/31/2019  00:55:29 AM

## 2019-10-31 NOTE — ECGEPIP
[facility] - ED

                                       

                                       Test Date:    2019-10-30

Pat Name:     DAT BLAKE             Department:   

Patient ID:   [id number]                 Room:         -

Gender:       Female                   Technician:   lidia

:          1980               Requested By: BETSY OSBORNE 

Order Number: [id number]     Reading MD:   Dia Neri

                                 Measurements

Intervals                              Axis          

Rate:         108                      P:            62

CA:           124                      QRS:          27

QRSD:         86                       T:            51

QT:           320                                    

QTc:          430                                    

                           Interpretive Statements

SINUS TACHYCARDIA

INDETERMINATE AXIS

SIMILAR 19

ABNORMAL RHYTHM ECG

LAFB

Electronically Signed on 10- 7:00:06 EDT by Dia Neri

## 2020-03-02 ENCOUNTER — HOSPITAL ENCOUNTER (OUTPATIENT)
Dept: HOSPITAL 53 - M PLARAD | Age: 40
End: 2020-03-02
Attending: INTERNAL MEDICINE
Payer: COMMERCIAL

## 2020-03-02 DIAGNOSIS — C83.31: Primary | ICD-10-CM

## 2020-03-02 PROCEDURE — 78815 PET IMAGE W/CT SKULL-THIGH: CPT

## 2020-03-03 NOTE — REP
PET/CT:

 

History: Restaging large B-cell lymphoma

 

Comparisons: Comparison PET/CT study October 8, 2019.  Comparison CT study is

September 25, 2019.

 

TECHNIQUE:

 

45 minutes following the intravenous injection of a 8.47 mCi dose of F-18 FDG,

three-dimensional PET scintigraphy is acquired from the skull base to the

proximal thighs. Triplanar noncontrast CT scanning is acquired through the same

anatomic range for attenuation correction, and image registration with scan

parameters optimized to minimize radiation exposure to the patient. PET

scintigraphy and CT datasets were fused and displayed on a workstation with

multiplanar and projection display capability.

 

PET/CT Findings: There is no abnormal head and neck hypermetabolic uptake.  There

is no visible lymphadenopathy.  A right-sided Cbxvfp-F-Yczn catheter is noted.

There is no abnormal hypermetabolic uptake within the chest.  No hilar or

mediastinal adenopathy is observed.  No abnormal pulmonary parenchymal opacity or

hypermetabolic uptake is seen.

 

In the abdomen and pelvis, there is normal distribution of tracer to the liver,

spleen, genitourinary, and gastrointestinal tracts.  No abnormal abdominal or

pelvic hypermetabolic uptake is seen.  No abnormal skeletal uptake is observed.

 

Impression:

 

Negative PET scintigraphy.

 

 

Electronically Signed by

Rishi Garzon MD 03/03/2020 08:39 A

## 2020-03-25 ENCOUNTER — HOSPITAL ENCOUNTER (OUTPATIENT)
Dept: HOSPITAL 53 - M RAD | Age: 40
End: 2020-03-25
Attending: INTERNAL MEDICINE
Payer: COMMERCIAL

## 2020-03-25 DIAGNOSIS — N28.1: ICD-10-CM

## 2020-03-25 DIAGNOSIS — C83.30: Primary | ICD-10-CM

## 2020-03-25 DIAGNOSIS — Z95.828: ICD-10-CM

## 2020-03-25 PROCEDURE — 70491 CT SOFT TISSUE NECK W/DYE: CPT

## 2020-03-25 PROCEDURE — 71260 CT THORAX DX C+: CPT

## 2020-03-25 NOTE — REP
CT CHEST WITH IV CONTRAST:

 

HISTORY:  Stage II diffuse large B-cell lymphoma for restaging.  Rule out

adenopathy.

 

COMPARISON CT STUDY:  October 31, 2019.

 

CT CONTRAST DOSE:  100 mL  of intravenous Isovue 370.

 

CT FINDINGS:  There is good opacification of the thoracic aorta and pulmonary

arterial tree.  No intravascular abnormality is seen.  There is a right-sided

central venous catheter consistent with an Dquvbb-A-Wgaa.  No hilar or

mediastinal mass or adenopathy is observed.  No pleural or pericardial effusion

is seen.  No axillary or other extrathoracic adenopathy is seen.  The lung fields

demonstrate mild linear plate-like atelectasis or fibrosis in the right middle

lobe.  This is slightly more prominent medially in the right middle lobe than it

was October 31, 2019 and is consistent with fibrosis.  Remaining lung fields are

clear.  No infiltrate or mass is seen.  No bony destructive lesion. There is a

small cyst in the upper pole of the right kidney.  No adrenal abnormality is

seen.  Visualized upper abdominal structures are unchanged and otherwise

unremarkable.

 

IMPRESSION:

 

No acute disease.

 

 

Electronically Signed by

Rishi Garzon MD 03/25/2020 01:25 P

## 2020-03-25 NOTE — REP
Clinical:  History of large B-cell lymphoma.  Restaging.

 

Technique:  Axial contrast enhanced images from the mid skull through the

thoracic inlet with coronal and sagittal re-formations using 75 ml Isovue 370

intravenous contrast material.

 

Comparison:  09/25/2019.

 

Findings:

The previously noted large right thyroid mass along with cervical adenopathy has

completely resolved.  The thyroid gland is now relatively symmetric and

demonstrates essentially normal enhancement and there is no significant cervical

adenopathy identified.  Specifically, the largest right-sided lymph node measures

approximately 9.5 mm maximal diameter and left cervical nodes measure up to

approximately 8 mm maximal diameter.

 

Nasopharyngeal, oral pharyngeal, parapharyngeal and retropharyngeal soft tissues

are essentially symmetric and normal.  Vascular structures through the neck are

symmetric.  No evidence for mass or mass effect.  Sinuses and mastoid air cells

are clear.  Bilateral orbits are symmetric and normal.  The osseous structures

appear intact and unremarkable.

 

Impression:

1.  Previously noted large right thyroid mass and cervical adenopathy completely

resolved.  Right thyroid lobe is now relatively normal and symmetric in

appearance and right-sided lymph nodes are essentially unremarkable and measure

up to 9.5 mm maximal diameter.

2.  Contrast enhanced neck CT is otherwise normal.

 

 

Electronically Signed by

Александр Renae MD 03/25/2020 11:09 A

## 2020-06-02 ENCOUNTER — HOSPITAL ENCOUNTER (OUTPATIENT)
Dept: HOSPITAL 53 - M RAD | Age: 40
End: 2020-06-02
Attending: INTERNAL MEDICINE
Payer: COMMERCIAL

## 2020-06-02 DIAGNOSIS — Z85.72: Primary | ICD-10-CM

## 2020-06-02 NOTE — REP
REASON:  History of B-cell lymphoma.

 

COMPARISON:  Multiple, the latest 3/25/2020.

 

CONTRAST:  100 mL Isovue-370.

 

The mediastinum and pulmonary minh are unchanged. No mass or adenopathy has

developed. There is no axillary adenopathy. There are no pleural or pericardial

effusions. There is no change in the imaged osseous structures.

 

Evaluation of the lung fields shows no new abnormal nodules, masses, or

opacities.

 

IMPRESSION:

 

Stable CT examination of the chest. There are no new abnormalities.

 

 

Electronically Signed by

Daniel Chaves DO 06/03/2020 11:20 A

## 2020-06-02 NOTE — REP
REASON:  B-cell lymphoma.

 

Latest prior for comparison is 09/25/2019, which was within normal limits,

showing no evidence of acute disease.

 

CONTRAST:  100 mL Isovue-370.

 

The liver, gallbladder, spleen, pancreas, adrenal glands, and kidneys are

unchanged. There is an incidental tiny hepatic cyst and incidental small right

renal cyst, status quo. The abdominal aorta and para-aortic regions are

unchanged. There is no para-aortic adenopathy. The bowel loops and their

mesenteries are again seen to be within normal limits. There is no free fluid or

free air. There is no intra-abdominal mass or adenopathy.

 

CT PELVIS:

 

The bowel loops and their mesenteries are essentially unchanged. There is no

evidence of a pelvic mass or adenopathy. There is no free fluid or free air.

 

Bone window technique through the examination shows no significant change in

appearance of the osseous structures. They are stable and intact.

 

IMPRESSION:

 

No significant change compared to the prior exam. There is no evidence of acute

disease. Findings as described above.

 

 

Electronically Signed by

Daniel Chaves DO 06/03/2020 11:20 A

## 2020-06-02 NOTE — REPVR
PROCEDURE INFORMATION: 

Exam: CT Neck With Contrast 

Exam date and time: 6/2/2020 3:18 PM 

Age: 40 years old 

Clinical indication: Neck pain; Additional info: H/o difused large cell B cell 

lymphoma 



TECHNIQUE: 

Imaging protocol: Computed tomography images of the neck with intravenous 

contrast. 

Radiation optimization: All CT scans at this facility use at least one of these 

dose optimization techniques: automated exposure control; mA and/or kV 

adjustment per patient size (includes targeted exams where dose is matched to 

clinical indication); or iterative reconstruction. 

Contrast material: ; Contrast volume: 75 ml; Contrast route: IV;  



COMPARISON: 

CT Neck with contrast 3/25/2020 10:34 AM 



FINDINGS: 

Tubes, catheters and devices: There is a right internal jugular MediPort. 



Orbits: The globes and orbits are intact and normal in appearance. 

Mastoid air cells: Clear. 

Auditory system: The middle ear spaces are clear. 



Sinuses: There is moderate opacification of the right frontal sinus. No 

air-fluid levels are noted in the sinuses. 

Nasal cavity: There is a chronic nasal septal perforation that is similar in 

appearance compared to the prior CT on 03/25/2020. Incidental note is made of a 

right nasal piercing. 

Nasopharynx: Normal. 

Oral Cavity: Normal. 

Dental: There are dental caries involving the right upper 1st premolar, which 

are similar in appearance compared to the prior CT neck on 03/25/2020 (image 35 

of the sagittal series 203). Several teeth are missing. 

Oropharynx: Normal. No enlargement of the palatine tonsils. No tonsillar or 

peritonsillar abscess. 

Hypopharynx: Normal. 

Larynx: Normal. No swelling of the epiglottis. No mass. 

Retropharyngeal space: Normal. No retropharyngeal edema or fluid collection. 

Submandibular/Parotid glands: Normal. 

Thyroid: There is a 4 mm rim calcified nodule in the lateral aspect of the 

midpole of the right lobe of the thyroid gland that is unchanged compared to 

the prior CT neck on 03/25/2020 (image 42 of the coronal series 202). No other 

thyroid nodules are noted. No enlargement of the thyroid gland is present. 

Lymph nodes: No enlarged lymph nodes. 



Trachea: Unremarkable. 

Lungs: The imaged lung apices are clear. The lungs were not fully imaged. 

Bones/joints: There is no fracture or dislocation. There are degenerative 

changes in the cervical spine. There are mild degenerative changes of both 

temporomandibular joints. No suspicious osteolytic or osteoblastic lesion is 

noted. 

Vasculature: The vertebral arteries, common carotid arteries, internal carotid 

arteries, and external carotid arteries are patent and there is no dissection. 

Soft tissues: Unremarkable. No soft tissue fluid collection. 



IMPRESSION: 

1. No acute findings, mass, or lymphadenopathy in the neck. 

2. Dental caries involving the right upper 1st premolar, which are similar in 

appearance compared to the prior CT neck on 03/25/2020. 

3. Chronic nasal septal perforation that is similar in appearance compared to 

the prior CT neck on 03/25/2020. 

4. 4 mm rim calcified nodule in the lateral aspect of the midpole of the right 

lobe of the thyroid gland that is unchanged compared to the prior CT neck on 

03/25/2020 and for which follow-up is not necessary.



COMMENTS: 

Consistent with the [employer]'s Incidental Findings 

Committee white paper (J Am Hayder Radiol 2015): In patients aged 35 years and 

older with an incidental thyroid nodule equal to or greater than 1.5 cm 

detected on CT, MRI or extrathyroidal US, further evaluation with dedicated 

thyroid US is recommended for patients with normal life expectancy and without 

comorbidities. For smaller nodules without suspicious features, no further 

evaluation or follow up is recommended. 



Electronically signed by: Alejo Harkins On 06/02/2020  21:18:23 PM

## 2020-09-12 ENCOUNTER — HOSPITAL ENCOUNTER (EMERGENCY)
Dept: HOSPITAL 53 - M ED | Age: 40
Discharge: HOME | End: 2020-09-12
Payer: COMMERCIAL

## 2020-09-12 VITALS — WEIGHT: 154.98 LBS | BODY MASS INDEX: 27.46 KG/M2 | HEIGHT: 63 IN

## 2020-09-12 VITALS — SYSTOLIC BLOOD PRESSURE: 110 MMHG | DIASTOLIC BLOOD PRESSURE: 57 MMHG

## 2020-09-12 DIAGNOSIS — F17.200: ICD-10-CM

## 2020-09-12 DIAGNOSIS — Z85.79: ICD-10-CM

## 2020-09-12 DIAGNOSIS — M54.9: Primary | ICD-10-CM

## 2020-09-12 DIAGNOSIS — Z79.899: ICD-10-CM

## 2020-09-12 LAB
ALBUMIN SERPL BCG-MCNC: 3.6 GM/DL (ref 3.2–5.2)
ALT SERPL W P-5'-P-CCNC: 22 U/L (ref 12–78)
BASOPHILS # BLD AUTO: 0 10^3/UL (ref 0–0.2)
BASOPHILS NFR BLD AUTO: 0.5 % (ref 0–1)
BILIRUB CONJ SERPL-MCNC: 0.1 MG/DL (ref 0–0.2)
BILIRUB SERPL-MCNC: 0.2 MG/DL (ref 0.2–1)
EOSINOPHIL # BLD AUTO: 0 10^3/UL (ref 0–0.5)
EOSINOPHIL NFR BLD AUTO: 0.5 % (ref 0–3)
HCT VFR BLD AUTO: 40.6 % (ref 36–47)
HGB BLD-MCNC: 14 G/DL (ref 12–15.5)
LIPASE SERPL-CCNC: 192 U/L (ref 73–393)
LYMPHOCYTES # BLD AUTO: 1.3 10^3/UL (ref 1.5–5)
LYMPHOCYTES NFR BLD AUTO: 23.1 % (ref 24–44)
MCH RBC QN AUTO: 32.4 PG (ref 27–33)
MCHC RBC AUTO-ENTMCNC: 34.5 G/DL (ref 32–36.5)
MCV RBC AUTO: 94 FL (ref 80–96)
MONOCYTES # BLD AUTO: 0.7 10^3/UL (ref 0–0.8)
MONOCYTES NFR BLD AUTO: 11.7 % (ref 0–5)
NEUTROPHILS # BLD AUTO: 3.7 10^3/UL (ref 1.5–8.5)
NEUTROPHILS NFR BLD AUTO: 63.9 % (ref 36–66)
PLATELET # BLD AUTO: 234 10^3/UL (ref 150–450)
PROT SERPL-MCNC: 6.6 GM/DL (ref 6.4–8.2)
RBC # BLD AUTO: 4.32 10^6/UL (ref 4–5.4)
WBC # BLD AUTO: 5.8 10^3/UL (ref 4–10)

## 2020-09-12 PROCEDURE — 81001 URINALYSIS AUTO W/SCOPE: CPT

## 2020-09-12 PROCEDURE — 96361 HYDRATE IV INFUSION ADD-ON: CPT

## 2020-09-12 PROCEDURE — 83690 ASSAY OF LIPASE: CPT

## 2020-09-12 PROCEDURE — 85025 COMPLETE CBC W/AUTO DIFF WBC: CPT

## 2020-09-12 PROCEDURE — 96375 TX/PRO/DX INJ NEW DRUG ADDON: CPT

## 2020-09-12 PROCEDURE — 99284 EMERGENCY DEPT VISIT MOD MDM: CPT

## 2020-09-12 PROCEDURE — 96374 THER/PROPH/DIAG INJ IV PUSH: CPT

## 2020-09-12 PROCEDURE — 80076 HEPATIC FUNCTION PANEL: CPT

## 2020-09-12 PROCEDURE — 84702 CHORIONIC GONADOTROPIN TEST: CPT

## 2020-09-12 PROCEDURE — 74176 CT ABD & PELVIS W/O CONTRAST: CPT

## 2020-09-12 PROCEDURE — 80047 BASIC METABLC PNL IONIZED CA: CPT

## 2020-11-16 ENCOUNTER — HOSPITAL ENCOUNTER (OUTPATIENT)
Dept: HOSPITAL 53 - M LABSMTC | Age: 40
End: 2020-11-16
Attending: PEDIATRICS
Payer: SELF-PAY

## 2020-11-16 DIAGNOSIS — Z20.828: Primary | ICD-10-CM

## 2020-12-03 ENCOUNTER — HOSPITAL ENCOUNTER (EMERGENCY)
Dept: HOSPITAL 53 - M ED | Age: 40
Discharge: HOME | End: 2020-12-03
Payer: COMMERCIAL

## 2020-12-03 VITALS — HEIGHT: 63 IN | WEIGHT: 168.43 LBS | BODY MASS INDEX: 29.84 KG/M2

## 2020-12-03 VITALS — DIASTOLIC BLOOD PRESSURE: 81 MMHG | SYSTOLIC BLOOD PRESSURE: 138 MMHG

## 2020-12-03 DIAGNOSIS — M47.812: ICD-10-CM

## 2020-12-03 DIAGNOSIS — F11.11: ICD-10-CM

## 2020-12-03 DIAGNOSIS — F17.200: ICD-10-CM

## 2020-12-03 DIAGNOSIS — M51.9: ICD-10-CM

## 2020-12-03 DIAGNOSIS — Z82.49: ICD-10-CM

## 2020-12-03 DIAGNOSIS — Z79.899: ICD-10-CM

## 2020-12-03 DIAGNOSIS — S46.911A: ICD-10-CM
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
## 2020-12-03 DIAGNOSIS — Z85.72: ICD-10-CM

## 2020-12-03 DIAGNOSIS — F90.9: ICD-10-CM
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
## 2020-12-03 DIAGNOSIS — S43.401A: Primary | ICD-10-CM

## 2020-12-03 DIAGNOSIS — F43.10: ICD-10-CM
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
## 2020-12-03 DIAGNOSIS — Z98.890: ICD-10-CM

## 2020-12-03 NOTE — REP
INDICATION:

R shoulder pain.



COMPARISON:

None.



TECHNIQUE:

Three views of the right shoulder are presented.



FINDINGS:

A right-sided Gtbhpv-L-Wglr catheter is seen with its tip in the expected location of

the superior vena cava.  The right glenohumeral and acromioclavicular joints are

normally aligned.  Periarticular soft tissues are unremarkable.  No fracture or

subluxation is seen.  No erosive changes are noted.



IMPRESSION:

Negative radiographs of the right shoulder.  Right-sided Cifhxb-L-Nvqi catheter noted

in place.





<Electronically signed by Pravin Garzon > 12/03/20 [id number]

## 2020-12-03 NOTE — REP
INDICATION:

r/o bulging disc.



COMPARISON:

2 June 2020 prior study..



TECHNIQUE:

Helical scanning is acquired and overlapping 2 mm high resolution axial images were

generated and reviewed at bone and soft tissue window settings.  Coronal and sagittal

multiplanar re-formations images are generated.



FINDINGS:

There is no evidence of cervical spine element fracture.  No skull base fracture is

seen.  Cervical vertebral body heights are preserved.  Alignment is normal.  Facet

joints are normally aligned bilaterally at each cervical level on multiplanar

re-formations images.  There is no evidence of intraspinal or paraspinal hematoma.  No

extra vertebral abnormality is seen.



There are degenerative spondylosis changes with straightening and slight reversal of

normal cervical lordosis.  Anterior discogenic spurring is present at C3-4 C5-6 and

C6-7.  At C6-7, there is posterior osteophytic ridging most prominent on the left and

there is significant left-sided foraminal narrowing.  No other disc bulging is

appreciated.  There is a small calcification in the right thyroid gland again noted

unchanged.



IMPRESSION:

Degenerative spondylosis changes.  Disc bulging and associated osteophyte formation

with left-sided uncovertebral spurring and left-sided neural foraminal narrowing at

C6-7..





<Electronically signed by Pravin Garzon > 12/03/20 [id number]

## 2021-03-12 ENCOUNTER — HOSPITAL ENCOUNTER (OUTPATIENT)
Dept: HOSPITAL 53 - M EMP | Age: 41
End: 2021-03-12
Attending: FAMILY MEDICINE

## 2021-03-12 DIAGNOSIS — Z11.59: Primary | ICD-10-CM

## 2021-03-12 LAB — RSV RNA NPH QL NAA+PROBE: NEGATIVE

## 2021-03-30 ENCOUNTER — HOSPITAL ENCOUNTER (OUTPATIENT)
Dept: HOSPITAL 53 - M TMIRPOV | Age: 41
End: 2021-03-30
Attending: RADIOLOGY
Payer: COMMERCIAL

## 2021-03-30 DIAGNOSIS — Z85.72: ICD-10-CM

## 2021-03-30 DIAGNOSIS — Z45.2: Primary | ICD-10-CM

## 2021-04-12 ENCOUNTER — HOSPITAL ENCOUNTER (OUTPATIENT)
Dept: HOSPITAL 53 - M LABSMTC | Age: 41
End: 2021-04-12
Attending: PEDIATRICS

## 2021-04-12 DIAGNOSIS — Z11.52: Primary | ICD-10-CM

## 2021-04-15 ENCOUNTER — HOSPITAL ENCOUNTER (OUTPATIENT)
Dept: HOSPITAL 53 - M IRPRO | Age: 41
End: 2021-04-15
Attending: RADIOLOGY
Payer: COMMERCIAL

## 2021-04-15 VITALS — DIASTOLIC BLOOD PRESSURE: 61 MMHG | SYSTOLIC BLOOD PRESSURE: 101 MMHG

## 2021-04-15 DIAGNOSIS — Z79.899: ICD-10-CM

## 2021-04-15 DIAGNOSIS — C90.00: Primary | ICD-10-CM

## 2021-04-15 NOTE — IRPON
IR Postoperative Note


Date Of Procedure:  Apr 15, 2021


Time Of Procedure:  11:15


IR Postoperative Note





Port Removal / Explant





Clinical Information:Lymphoma. Treatment complete. Patient would like port 

removed..





Physician: Dr. Sanchez 





Procedure: The patient was advised of the benefits, risks, and alternatives of 

the procedure and informed consent was obtained.





A time out was performed with verification of the patient's name, MRN, site of 

procedure, and type of procedure to be performed. The patient was positioned in

the supine position on the angiographic table. The site was prepped and draped 

in the usual sterile fashion.





Moderate sedation was performed by the physician including the presence of an 

independent trained RN who assisted in monitoring the patient's level of consci

ousness and physiological status. Following the administration of fentanyl and 

Versed the physician spent 30 minutes of continuous face-to-face time with the 

patient.





A  radiograph reveals a right sided port.





The soft tissues overlying the port were anesthetized with lidocaine. An 

incision was made over the port using a 15 blade scalpel in the location of the 

prior incision. The catheter was then freed with blunt dissection and 

extracted. Pressure was applied to obtain hemostasis. The port was then freed 

with blunt dissection and subsequently removed. There were no signs of 

infection. After hemostasis was achieved, the incision was closed with 

interrupted deep 3-0 Vicryl sutures and subcuticular Monocryl suture followed by

glue and steri-strips. The site was covered with a sterile dressing.





The patient tolerated the procedure well and was returned to the PRU in stable c

ondition.





EBL:Less than 5 mL





Complications:None.





Conclusions:


1. Successful explant of a right-sided port.





2. No signs of infection.





Thank you for this referral











RONNY SANCHEZ MD               Apr 15, 2021 11:16

## 2021-04-15 NOTE — IRHP
[facility] IR Pre-Procedure H & P


General


Date of Service:  Apr 15, 2021


Procedure:  Same Day Surgery





Interval History and Physical


I have seen the patient and reviewed last H & P performed within 30 days. 


There is no significant interval change.





History of Present Illness


Chief Complaint


The patient is a 40-year-old female admitted with a reason for visit of 

Lymphoma, Treatment Complete.


PRE-PROCEDURE DIAGNOSIS:lymphoma





HEART: normal rate.


LUNGS: normal breathing at rest.





ASA Classification


ASA Classification:  II-Mild systemic disease


Mallampati Score:  II


NPO:  Yes


Problems with prior sedation:  No


Obstructive Sleep Apnea:  No





Plan


moderate sedation





Allergies


Coded Allergies:  


     No Known Allergies (Unverified , 6/18/17)





Home Medications


Scheduled


Dextroamphetamine/Amphetamine (Dextroamp-Amphet ER 30 mg Cap), 1 CAP PO DAILY, 

(Reported)


Lidocaine (Lidoderm), 1 PATCH TOP DAILY


Lidocaine/Prilocaine (Lidocaine-Prilocaine Cream), 1 DOSE TOP ASDIRECTED


Valacyclovir HCl (Valacyclovir), 1 TAB PO DAILY, (Reported)





Scheduled PRN


Ibuprofen (Ibuprofen), 800 MG PO Q8HP PRN for PAIN











RONNY ARRIAZA MD               Apr 15, 2021 08:22

## 2021-05-20 ENCOUNTER — HOSPITAL ENCOUNTER (OUTPATIENT)
Dept: HOSPITAL 53 - M LABSMTC | Age: 41
End: 2021-05-20
Attending: PEDIATRICS

## 2021-05-20 DIAGNOSIS — Z20.822: Primary | ICD-10-CM

## 2021-09-10 ENCOUNTER — HOSPITAL ENCOUNTER (OUTPATIENT)
Dept: HOSPITAL 53 - M RAD | Age: 41
End: 2021-09-10
Attending: INTERNAL MEDICINE
Payer: COMMERCIAL

## 2021-09-10 DIAGNOSIS — E04.1: ICD-10-CM

## 2021-09-10 DIAGNOSIS — N28.89: ICD-10-CM

## 2021-09-10 DIAGNOSIS — C85.90: Primary | ICD-10-CM

## 2021-09-10 LAB
ALBUMIN SERPL BCG-MCNC: 4.3 GM/DL (ref 3.2–5.2)
ALT SERPL W P-5'-P-CCNC: 25 U/L (ref 12–78)
BASOPHILS # BLD AUTO: 0 10^3/UL (ref 0–0.2)
BASOPHILS NFR BLD AUTO: 0.7 % (ref 0–1)
BILIRUB SERPL-MCNC: 0.4 MG/DL (ref 0.2–1)
BUN SERPL-MCNC: 13 MG/DL (ref 7–18)
CALCIUM SERPL-MCNC: 9.7 MG/DL (ref 8.5–10.1)
CHLORIDE SERPL-SCNC: 103 MEQ/L (ref 98–107)
CO2 SERPL-SCNC: 31 MEQ/L (ref 21–32)
CREAT SERPL-MCNC: 0.77 MG/DL (ref 0.55–1.3)
EOSINOPHIL # BLD AUTO: 0 10^3/UL (ref 0–0.5)
EOSINOPHIL NFR BLD AUTO: 0.4 % (ref 0–3)
GFR SERPL CREATININE-BSD FRML MDRD: > 60 ML/MIN/{1.73_M2} (ref 58–?)
GLUCOSE SERPL-MCNC: 86 MG/DL (ref 70–100)
HCT VFR BLD AUTO: 43 % (ref 36–47)
HGB BLD-MCNC: 14.2 G/DL (ref 12–15.5)
LDH SERPL L TO P-CCNC: 183 U/L (ref 84–246)
LYMPHOCYTES # BLD AUTO: 1.3 10^3/UL (ref 1.5–5)
LYMPHOCYTES NFR BLD AUTO: 23.5 % (ref 24–44)
MCH RBC QN AUTO: 31.1 PG (ref 27–33)
MCHC RBC AUTO-ENTMCNC: 33 G/DL (ref 32–36.5)
MCV RBC AUTO: 94.3 FL (ref 80–96)
MONOCYTES # BLD AUTO: 0.6 10^3/UL (ref 0–0.8)
MONOCYTES NFR BLD AUTO: 9.8 % (ref 2–8)
NEUTROPHILS # BLD AUTO: 3.7 10^3/UL (ref 1.5–8.5)
NEUTROPHILS NFR BLD AUTO: 65.1 % (ref 36–66)
PLATELET # BLD AUTO: 265 10^3/UL (ref 150–450)
POTASSIUM SERPL-SCNC: 3.8 MEQ/L (ref 3.5–5.1)
PROT SERPL-MCNC: 7.7 GM/DL (ref 6.4–8.2)
RBC # BLD AUTO: 4.56 10^6/UL (ref 4–5.4)
SODIUM SERPL-SCNC: 140 MEQ/L (ref 136–145)
WBC # BLD AUTO: 5.7 10^3/UL (ref 4–10)

## 2021-09-10 PROCEDURE — 80053 COMPREHEN METABOLIC PANEL: CPT

## 2021-09-10 PROCEDURE — 74177 CT ABD & PELVIS W/CONTRAST: CPT

## 2021-09-10 PROCEDURE — 70491 CT SOFT TISSUE NECK W/DYE: CPT

## 2021-09-10 PROCEDURE — 85025 COMPLETE CBC W/AUTO DIFF WBC: CPT

## 2021-09-10 PROCEDURE — 36415 COLL VENOUS BLD VENIPUNCTURE: CPT

## 2021-09-10 PROCEDURE — 71260 CT THORAX DX C+: CPT

## 2021-09-10 PROCEDURE — 83615 LACTATE (LD) (LDH) ENZYME: CPT

## 2021-09-10 NOTE — REP
INDICATION:

LYMPHOMA- COMING BACK AROUND 3



COMPARISON:

09/12/2020 as well as other prior exams.



TECHNIQUE:

CT Scan of the abdomen and pelvis was performed with intravenous administration of 100

cc of Isovue 370, and oral contrast.  Sagittal and coronal reconstruction images are

performed.



FINDINGS:

Lung bases: Unremarkable.

Liver:  Normal

Gallbladder:  Unremarkable.

Spleen:  Normal.

Adrenals:  Normal.

Pancreas:  Normal.

Kidneys:  There is a stable 1.2 cm nodule in the upper pole the right kidney.

Small and large bowel: Unremarkable.

Free fluid:  None.

Abdominal aorta: No aneurysm or dissection.

Adenopathy:  None.

Appendix: Not inflamed.

Osseous structures:  Unremarkable.

Pelvis:  No mass.



IMPRESSION:

Stable exam.  No new findings.





<Electronically signed by Tyrel Tao > 09/10/21 [id number]

## 2021-09-10 NOTE — REP
INDICATION:

LYMPHOMA- COMING BACK AROUND 3.



COMPARISON:

06/02/2020.



TECHNIQUE:

CT chest performed following the intravenous administration of 100 cc of Isovue 370.

Sagittal and coronal reconstruction images are performed.



FINDINGS:

Lungs: Clear, no infiltrate or nodule.

Mediastinum: No adenopathy.

Di: No adenopathy.

Axilla: No adenopathy.

Pleura: No effusion.

Heart: Not enlarged.

Thoracic aorta: No aneurysm or dissection.



Visualized osseous structures: Unremarkable.



IMPRESSION:

Unremarkable CT chest.  No change since prior study.





<Electronically signed by Tyrel Tao > 09/10/21 [id number]

## 2021-09-10 NOTE — REPVR
PROCEDURE INFORMATION: 

Exam: CT Neck With Contrast 

Exam date and time: 9/10/2021 4:43 PM 

Age: 41 years old 

Clinical indication: Condition or disease; Cancer; Other: Lymphoma; Additional 

info: Lymphoma- coming back around 3 



TECHNIQUE: 

Imaging protocol: Computed tomography images of the neck with contrast. 

Radiation optimization: All CT scans at this facility use at least one of these 

dose optimization techniques: automated exposure control; mA and/or kV 

adjustment per patient size (includes targeted exams where dose is matched to 

clinical indication); or iterative reconstruction. 

Contrast material: ISOVUE 370; Contrast volume: 100 ml; Contrast route: 

INTRAVENOUS (IV);  



COMPARISON: 

CT Neck with contrast 6/2/2020 3:27 PM 



FINDINGS: 

Limitations: The study is mildly limited due to patient motion artifact. 



Nasopharynx: Unremarkable. 

Oropharynx: Unremarkable. No significant tonsillar enlargement. 

Hypopharynx: Unremarkable. 

Larynx: Unremarkable. Normal epiglottis. 

Retropharyngeal space: Unremarkable. 

Submandibular/Parotid glands: Normal. Glands are normal in size. 

Thyroid: A 5 mm calcified right thyroid nodule is present. No further follow-up 

is necessary. 

Lymph nodes: Unremarkable. No lymphadenopathy. 



Trachea: Visualized trachea is unremarkable. 

Lungs: Unremarkable as visualized. 

Bones/joints: Moderate degenerative changes of the cervical spine are present. 

Soft tissues: Unremarkable. No significant soft tissue swelling. 



IMPRESSION: 

1. No acute abnormality. 

2. Chronic findings as discussed above. 



COMMENTS: 

Consistent with the American College of [employer]iology's Incidental Findings 

Committee white paper (J Am Hayder Radiol 2015): In patients aged 35 years and 

older with an incidental thyroid nodule equal to or greater than 1.5 cm 

detected on CT, MRI or extrathyroidal US, further evaluation with dedicated 

thyroid US is recommended for patients with normal life expectancy and without 

comorbidities. For smaller nodules without suspicious features, no further 

evaluation or follow up is recommended. 



Electronically signed by: Dustin Meraz On 09/10/2021  17:51:47 PM

## 2021-10-05 ENCOUNTER — HOSPITAL ENCOUNTER (OUTPATIENT)
Dept: HOSPITAL 53 - M EMP | Age: 41
End: 2021-10-05
Attending: FAMILY MEDICINE

## 2021-10-05 DIAGNOSIS — Z20.822: Primary | ICD-10-CM

## 2021-10-05 LAB — RSV RNA NPH QL NAA+PROBE: NEGATIVE

## 2021-10-12 ENCOUNTER — HOSPITAL ENCOUNTER (OUTPATIENT)
Dept: HOSPITAL 53 - M EMP | Age: 41
End: 2021-10-12
Attending: FAMILY MEDICINE

## 2021-10-12 DIAGNOSIS — Z11.52: Primary | ICD-10-CM

## 2022-05-26 ENCOUNTER — HOSPITAL ENCOUNTER (OUTPATIENT)
Dept: HOSPITAL 53 - M RAD | Age: 42
End: 2022-05-26
Attending: INTERNAL MEDICINE
Payer: COMMERCIAL

## 2022-05-26 DIAGNOSIS — E04.1: ICD-10-CM

## 2022-05-26 DIAGNOSIS — C83.30: Primary | ICD-10-CM

## 2022-05-27 ENCOUNTER — HOSPITAL ENCOUNTER (OUTPATIENT)
Dept: HOSPITAL 53 - M EMP | Age: 42
End: 2022-05-27
Attending: FAMILY MEDICINE

## 2022-05-27 DIAGNOSIS — Z11.52: Primary | ICD-10-CM

## 2022-05-27 LAB — RSV RNA NPH QL NAA+PROBE: NEGATIVE

## 2022-10-21 ENCOUNTER — HOSPITAL ENCOUNTER (OUTPATIENT)
Dept: HOSPITAL 53 - M EMP | Age: 42
End: 2022-10-21
Attending: FAMILY MEDICINE

## 2022-10-21 DIAGNOSIS — Z20.822: Primary | ICD-10-CM

## 2022-10-24 ENCOUNTER — HOSPITAL ENCOUNTER (OUTPATIENT)
Dept: HOSPITAL 53 - M EMP | Age: 42
End: 2022-10-24
Attending: FAMILY MEDICINE

## 2022-10-24 DIAGNOSIS — Z11.52: ICD-10-CM

## 2022-10-24 DIAGNOSIS — Z20.822: Primary | ICD-10-CM

## 2022-10-24 LAB — RSV RNA NPH QL NAA+PROBE: NEGATIVE

## 2023-11-25 ENCOUNTER — HOSPITAL ENCOUNTER (OUTPATIENT)
Dept: HOSPITAL 53 - M EMP | Age: 43
End: 2023-11-25
Attending: FAMILY MEDICINE

## 2023-11-25 DIAGNOSIS — Z20.822: Primary | ICD-10-CM

## 2023-11-25 LAB — RSV RNA NPH QL NAA+PROBE: NEGATIVE

## 2023-11-28 ENCOUNTER — HOSPITAL ENCOUNTER (OUTPATIENT)
Dept: HOSPITAL 53 - M EMP | Age: 43
End: 2023-11-28
Attending: FAMILY MEDICINE

## 2023-11-28 DIAGNOSIS — Z11.52: Primary | ICD-10-CM
